# Patient Record
Sex: FEMALE
[De-identification: names, ages, dates, MRNs, and addresses within clinical notes are randomized per-mention and may not be internally consistent; named-entity substitution may affect disease eponyms.]

---

## 2022-07-27 PROBLEM — Z00.00 ENCOUNTER FOR PREVENTIVE HEALTH EXAMINATION: Status: ACTIVE | Noted: 2022-07-27

## 2022-07-28 ENCOUNTER — APPOINTMENT (OUTPATIENT)
Dept: ORTHOPEDIC SURGERY | Facility: CLINIC | Age: 52
End: 2022-07-28
Payer: COMMERCIAL

## 2022-07-28 VITALS — HEIGHT: 65 IN | BODY MASS INDEX: 19.99 KG/M2 | WEIGHT: 120 LBS

## 2022-07-28 DIAGNOSIS — Z78.9 OTHER SPECIFIED HEALTH STATUS: ICD-10-CM

## 2022-07-28 DIAGNOSIS — M23.92 UNSPECIFIED INTERNAL DERANGEMENT OF LEFT KNEE: ICD-10-CM

## 2022-07-28 PROCEDURE — J3490M: CUSTOM

## 2022-07-28 PROCEDURE — 99203 OFFICE O/P NEW LOW 30 MIN: CPT | Mod: 25

## 2022-07-28 PROCEDURE — 99204 OFFICE O/P NEW MOD 45 MIN: CPT | Mod: 25

## 2022-07-28 PROCEDURE — 20610 DRAIN/INJ JOINT/BURSA W/O US: CPT | Mod: LT

## 2022-07-28 PROCEDURE — 73564 X-RAY EXAM KNEE 4 OR MORE: CPT | Mod: LT

## 2022-07-28 NOTE — HISTORY OF PRESENT ILLNESS
[Left Leg] : left leg [Gradual] : gradual [4] : 4 [0] : 0 [Localized] : localized [Intermittent] : intermittent [Household chores] : household chores [Rest] : rest [Bending forward] : bending forward [Exercising] : exercising [de-identified] : PT PRESENTS HERE TODAY WITH LEFT KNEE PAIN FOR COUPLE MONTHS AFTER DOING PILATES. FEELS STIFF AND SWOLLEN. DIFF WITH FLECXION. THIS PAST WEEK GAVE OUT ON HER. \par  [] : no [FreeTextEntry1] : KNEE [FreeTextEntry5] : NO INJURY  [de-identified] : NOTHING

## 2022-07-28 NOTE — PHYSICAL EXAM
[Left] : left knee [NL (140)] : flexion 140 degrees [NL (0)] : extension 0 degrees [5___] : hamstring 5[unfilled]/5 [Positive] : positive Brice [] : no atrophy

## 2022-07-28 NOTE — PROCEDURE
[de-identified] : Procedure Name: Large Joint Injection / Aspiration: Depomedrol and Marcaine\par Anesthesia: ethyl chloride sprayed topically.. \par Depomedrol: An injection of Depomedrol 80 mg , 1 cc. \par Marcaine: 5 cc. \par Medication was injected in the left knee. Patient has tried OTC's including aspirin, Ibuprofen, Aleve etc or prescription\par NSAIDS, and/or exercises at home and/ or physical therapy without satisfactory response. After verbal consent using sterile preparation and technique. The risks, benefits, and alternatives to cortisone injection were explained in full to the patient. Risks outlined include but are not limited to infection, sepsis, bleeding, scarring, skin discoloration, temporary  increase in pain, syncopal episode, failure to resolve symptoms, allergic reaction, symptom recurrence, and elevation of blood sugar in diabetics. Patient understood the risks. All questions were answered. After discussion of options, patient requested an injection. Oral informed consent was obtained and sterile prep was done of the injection\par site. Sterile technique was utilized for the procedure including the preparation of the solutions used for the injection. Patient tolerated the procedure well. Advised to ice the injection site this evening. Prep with alcohol locally to site. Sterile technique used. Post Procedure Instructions: Patient was advised to call if redness, pain, or fever occur and\par apply ice for 15 min. out of every hour for the next 12-24 hours as tolerated.

## 2022-07-28 NOTE — ASSESSMENT
[FreeTextEntry1] : 52 year F WITH MODERATE LT KNEE PAIN. SHE FELT SOME INSTABILITY DURING PILATES. PAIN WORSENS WITH STAIRS AND WALKING PROLONGED DISTANCES. PAIN IS AFFECTING ADL AND FUNCTIONAL ACTIVITIES. XRAYS REVIEWED AS NORMAL. TREATMENT OPTIONS REVIEWED. LEFT KNEE MRI INDICATED TO EVAL FOR PFOA & LMT. LT KNEE CSI TODAY.

## 2022-08-04 ENCOUNTER — APPOINTMENT (OUTPATIENT)
Dept: ORTHOPEDIC SURGERY | Facility: CLINIC | Age: 52
End: 2022-08-04

## 2022-09-07 ENCOUNTER — APPOINTMENT (OUTPATIENT)
Dept: ORTHOPEDIC SURGERY | Facility: CLINIC | Age: 52
End: 2022-09-07